# Patient Record
Sex: FEMALE | URBAN - METROPOLITAN AREA
[De-identification: names, ages, dates, MRNs, and addresses within clinical notes are randomized per-mention and may not be internally consistent; named-entity substitution may affect disease eponyms.]

---

## 2020-03-13 ENCOUNTER — OFFICE VISIT (OUTPATIENT)
Dept: URBAN - METROPOLITAN AREA CLINIC 17 | Facility: CLINIC | Age: 75
End: 2020-03-13
Payer: MEDICARE

## 2020-03-13 DIAGNOSIS — E11.3293 TYPE 2 DIAB W MILD NONPRLF DIABETIC RTNOP W/O MACULAR EDEMA, BILATERAL: ICD-10-CM

## 2020-03-13 DIAGNOSIS — H35.341 MACULAR CYST, HOLE, OR PSEUDOHOLE, RIGHT EYE: Primary | ICD-10-CM

## 2020-03-13 DIAGNOSIS — H04.123 DRY EYE SYNDROME OF BILATERAL LACRIMAL GLANDS: ICD-10-CM

## 2020-03-13 DIAGNOSIS — H43.813 VITREOUS DEGENERATION, BILATERAL: ICD-10-CM

## 2020-03-13 DIAGNOSIS — H11.153 PINGUECULA, BILATERAL: ICD-10-CM

## 2020-03-13 PROCEDURE — 99204 OFFICE O/P NEW MOD 45 MIN: CPT | Performed by: OPTOMETRIST

## 2020-03-13 PROCEDURE — 92134 CPTRZ OPH DX IMG PST SGM RTA: CPT | Performed by: OPTOMETRIST

## 2020-03-13 ASSESSMENT — INTRAOCULAR PRESSURE
OS: 15
OD: 14

## 2020-03-13 NOTE — IMPRESSION/PLAN
Impression: Type 2 diab w mild nonprlf diabetic rtnop w/o macular edema, bilateral: L74.5361. Plan: Diabetes type II: mild background diabetic retinopathy, no signs of neovascularization noted. No treatment necessary at this time. Patient was instructed to monitor vision for sudden changes and to call if visual changes noted. Discussed ocular and systemic benefits of blood sugar control.

## 2020-03-13 NOTE — IMPRESSION/PLAN
Impression: Macular cyst, hole, or pseudohole, right eye: H35.341. Plan: Discussed diagnosis in detail with patient. Discussed treatment options with patient. Reassured patient of current condition and treatment. Consult recommended [[facility]  OCT shows Macular hole

## 2020-03-13 NOTE — IMPRESSION/PLAN
Impression: Puckering of macula, bilateral: H35.373. Plan: Discussed diagnosis in detail with patient. Discussed treatment options with patient. Reassured patient of current condition and treatment. Will continue to observe condition and or symptoms. Consult recommended [[facility]].

## 2020-03-13 NOTE — IMPRESSION/PLAN
Impression: Pinguecula, bilateral: H11.153. Plan: Discussed diagnosis in detail with patient. No treatment is required at this time. Will continue to observe condition and or symptoms. Patient instructed to call if condition gets worse.

## 2020-04-27 ENCOUNTER — OFFICE VISIT (OUTPATIENT)
Dept: URBAN - METROPOLITAN AREA CLINIC 17 | Facility: CLINIC | Age: 75
End: 2020-04-27
Payer: MEDICARE

## 2020-04-27 DIAGNOSIS — H43.311 VITREOUS MEMBRANES AND STRANDS, RIGHT EYE: ICD-10-CM

## 2020-04-27 DIAGNOSIS — H35.373 PUCKERING OF MACULA, BILATERAL: Primary | ICD-10-CM

## 2020-04-27 DIAGNOSIS — H43.821 VITREOMACULAR TRACTION OF RIGHT EYE: ICD-10-CM

## 2020-04-27 PROCEDURE — 92004 COMPRE OPH EXAM NEW PT 1/>: CPT | Performed by: OPHTHALMOLOGY

## 2020-04-27 RX ORDER — PREDNISOLONE ACETATE 10 MG/ML
1 % SUSPENSION/ DROPS OPHTHALMIC
Qty: 10 | Refills: 5 | Status: INACTIVE
Start: 2020-04-27 | End: 2020-10-22

## 2020-04-27 RX ORDER — OFLOXACIN 3 MG/ML
0.3 % SOLUTION/ DROPS OPHTHALMIC
Qty: 5 | Refills: 3 | Status: INACTIVE
Start: 2020-04-27 | End: 2020-04-27

## 2020-04-27 ASSESSMENT — INTRAOCULAR PRESSURE
OS: 20
OD: 19

## 2020-04-27 NOTE — IMPRESSION/PLAN
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 OD. Condition: worsening. Vision: vision affected. Plan: Advised patient of condition. Discussed diagnosis in detail with patient.  See notes above

## 2020-04-27 NOTE — IMPRESSION/PLAN
Impression: Vitreous membranes and strands, right eye: H43.311 OD. Condition: worsening. Vision: vision affected. Plan: Advised patient of condition. Discussed diagnosis in detail with patient.  See notes above

## 2020-04-27 NOTE — IMPRESSION/PLAN
Impression: Puckering of macula, bilateral: H35.373. OD>OS. Condition: worsening. Vision: vision affected OD>OS. Plan: Discussed diagnosis in detail with patient. Discussed risks of progression. Surgical treatment is recommended to remove scar tissue for vision improvement PPVx RIGHT EYE FIRST. Surgical risks and benefits were discussed, explained and understood by patient. All questions answered. RL1. Patient elects to proceed with recommendation. OCT performed today: ERM/ VMT with significant traction cystic changes OD and Optos shows VMT with significant traction with cystic change OD. Educational material provided to patient. The exam, OCT and optos of the left eye shows an ERM. Surgery is not required at this time. Recommend observation will reassess once the right eye has recovered from surgery. 
Erxed Drops to pharmacy on file

## 2020-06-22 ENCOUNTER — SURGERY (OUTPATIENT)
Dept: URBAN - METROPOLITAN AREA SURGERY 7 | Facility: SURGERY | Age: 75
End: 2020-06-22
Payer: MEDICARE

## 2020-06-22 PROCEDURE — 67042 VIT FOR MACULAR HOLE: CPT | Performed by: OPHTHALMOLOGY

## 2020-06-23 ENCOUNTER — POST-OPERATIVE VISIT (OUTPATIENT)
Dept: URBAN - METROPOLITAN AREA CLINIC 17 | Facility: CLINIC | Age: 75
End: 2020-06-23

## 2020-06-23 DIAGNOSIS — Z48.810 ENCNTR FOR SURGICAL AFTCR FOL SURGERY ON THE SENSE ORGANS: ICD-10-CM

## 2020-06-23 PROCEDURE — 99024 POSTOP FOLLOW-UP VISIT: CPT | Performed by: OPTOMETRIST

## 2020-06-23 ASSESSMENT — INTRAOCULAR PRESSURE: OS: 18

## 2020-06-30 ENCOUNTER — POST-OPERATIVE VISIT (OUTPATIENT)
Dept: URBAN - METROPOLITAN AREA CLINIC 17 | Facility: CLINIC | Age: 75
End: 2020-06-30

## 2020-06-30 PROCEDURE — 99024 POSTOP FOLLOW-UP VISIT: CPT | Performed by: OPTOMETRIST

## 2020-06-30 ASSESSMENT — INTRAOCULAR PRESSURE
OD: 24
OS: 15

## 2020-07-13 ENCOUNTER — POST-OPERATIVE VISIT (OUTPATIENT)
Dept: URBAN - METROPOLITAN AREA CLINIC 33 | Facility: CLINIC | Age: 75
End: 2020-07-13
Payer: MEDICARE

## 2020-07-13 DIAGNOSIS — Z09 ENCNTR FOR F/U EXAM AFT TRTMT FOR COND OTH THAN MALIG NEOPLM: Primary | ICD-10-CM

## 2020-07-13 PROCEDURE — 99024 POSTOP FOLLOW-UP VISIT: CPT | Performed by: OPHTHALMOLOGY

## 2020-07-13 ASSESSMENT — INTRAOCULAR PRESSURE
OD: 15
OS: 16

## 2020-08-12 ENCOUNTER — POST-OPERATIVE VISIT (OUTPATIENT)
Dept: URBAN - METROPOLITAN AREA CLINIC 17 | Facility: CLINIC | Age: 75
End: 2020-08-12
Payer: MEDICARE

## 2020-08-12 PROCEDURE — 99024 POSTOP FOLLOW-UP VISIT: CPT | Performed by: OPTOMETRIST

## 2020-08-12 ASSESSMENT — INTRAOCULAR PRESSURE
OS: 16
OD: 19

## 2020-10-22 ENCOUNTER — OFFICE VISIT (OUTPATIENT)
Dept: URBAN - METROPOLITAN AREA CLINIC 17 | Facility: CLINIC | Age: 75
End: 2020-10-22
Payer: MEDICARE

## 2020-10-22 DIAGNOSIS — H25.13 AGE-RELATED NUCLEAR CATARACT, BILATERAL: ICD-10-CM

## 2020-10-22 PROCEDURE — 92014 COMPRE OPH EXAM EST PT 1/>: CPT | Performed by: OPHTHALMOLOGY

## 2020-10-22 PROCEDURE — 92134 CPTRZ OPH DX IMG PST SGM RTA: CPT | Performed by: OPHTHALMOLOGY

## 2020-10-22 ASSESSMENT — INTRAOCULAR PRESSURE
OD: 15
OS: 12

## 2020-10-22 NOTE — IMPRESSION/PLAN
Impression: Age-related nuclear cataract, bilateral: H25.13. OD >OS Condition: worsening. Vision: vision affected. Plan: Advised patient of condition. Discussed diagnosis in detail with patient.  Consult recommended with [facility]act Special[facility].

## 2020-10-22 NOTE — IMPRESSION/PLAN
Impression: Puckering of macula, bilateral: H35.373. OD>OS. Condition: resolved OD and stable OS. Vision: vision improved OD vision affected OS.
s/p 25G PPVX,ERMX CATA,Mem Blue OD 6/22/2020 Plan: Exam right eye shows mild ILM negative ERM s/p PPVx and exam left eye shows ERM. Advised patient of condition. Discussed diagnosis in detail with patient. No treatment is required at this time. Will continue to observe condition and or symptoms. Call if [address] worsens. OCT OU shows mild ILM/ERM.

## 2021-01-18 ENCOUNTER — OFFICE VISIT (OUTPATIENT)
Dept: URBAN - METROPOLITAN AREA CLINIC 17 | Facility: CLINIC | Age: 76
End: 2021-01-18
Payer: MEDICARE

## 2021-01-18 DIAGNOSIS — H25.811 COMBINED FORMS OF AGE-RELATED CATARACT, RIGHT EYE: Primary | ICD-10-CM

## 2021-01-18 DIAGNOSIS — H35.372 PUCKERING OF MACULA, LEFT EYE: ICD-10-CM

## 2021-01-18 DIAGNOSIS — H25.12 AGE-RELATED NUCLEAR CATARACT, LEFT EYE: ICD-10-CM

## 2021-01-18 PROCEDURE — 99214 OFFICE O/P EST MOD 30 MIN: CPT | Performed by: OPHTHALMOLOGY

## 2021-01-18 ASSESSMENT — VISUAL ACUITY
OD: 20/150
OS: 20/40

## 2021-01-18 ASSESSMENT — INTRAOCULAR PRESSURE
OS: 17
OD: 16

## 2021-01-18 ASSESSMENT — KERATOMETRY
OS: 45.50
OD: 45.63

## 2021-01-18 NOTE — IMPRESSION/PLAN
Impression: Puckering of macula, left eye: H35.372. Plan: Discussed diagnosis in detail with patient. Advised patient of condition, recommend continuing with retina care provided by Dr Benny Sanchez.  Keep all upcoming appts

## 2021-01-18 NOTE — IMPRESSION/PLAN
Impression: Combined forms of age-related cataract, right eye: H25.811. Condition: established, worsening. Symptoms: could improve with surgery. Plan: Cataract accounts for patient's complaints. Reviewed risks, benefits, and procedure. Patient desires surgery, schedule ce/iol OD, RL2, discussed lens options, distance refractive target, patient is clear for surgery in [facility]. Pt to use NSAID and Steroids (Or Combo drops) for 6-8 weeks after surgery.

## 2021-01-18 NOTE — IMPRESSION/PLAN
Impression: Age-related nuclear cataract, left eye: H25.12. Plan: Discussed diagnosis in detail with patient. No surgical treatment currently recommended. The patient will monitor vision changes and contact us with any decrease in vision.  Continue retina care with Dr Cecile White

## 2022-07-29 ENCOUNTER — OFFICE VISIT (OUTPATIENT)
Dept: URBAN - METROPOLITAN AREA CLINIC 17 | Facility: CLINIC | Age: 77
End: 2022-07-29
Payer: MEDICARE

## 2022-07-29 DIAGNOSIS — H25.12 AGE-RELATED NUCLEAR CATARACT, LEFT EYE: ICD-10-CM

## 2022-07-29 DIAGNOSIS — H35.372 PUCKERING OF MACULA, LEFT EYE: ICD-10-CM

## 2022-07-29 DIAGNOSIS — H25.811 COMBINED FORMS OF AGE-RELATED CATARACT, RIGHT EYE: Primary | ICD-10-CM

## 2022-07-29 PROCEDURE — 99214 OFFICE O/P EST MOD 30 MIN: CPT | Performed by: OPHTHALMOLOGY

## 2022-07-29 ASSESSMENT — KERATOMETRY
OD: 45.50
OS: 45.13

## 2022-07-29 ASSESSMENT — INTRAOCULAR PRESSURE
OS: 12
OD: 16

## 2022-07-29 ASSESSMENT — VISUAL ACUITY
OD: 20/400
OS: 20/30

## 2022-07-29 NOTE — IMPRESSION/PLAN
Impression: Puckering of macula, left eye: H35.372. Plan: Discussed diagnosis in detail with patient. Advised patient of condition, recommend continuing with retina care provided by Dr Lianna Reinoso. Recommend pt reschedule retina appt.

## 2022-07-29 NOTE — IMPRESSION/PLAN
Impression: Combined forms of age-related cataract, right eye: H25.811. Condition: established, worsening. Symptoms: could improve with surgery. Plan: Cataract accounts for patient's complaints. Reviewed risks, benefits, and procedure. Patient desires surgery, schedule ce/iol OD, RL3 (Cardiologist), discussed lens options, distance refractive target, patient is clear for surgery in [place] [id number]. Recommend Dexycu to avoid noncompliance with drops and to help maintain infection/inflammation. Pt to use NSAID for 6-8 weeks after surgery. Advised pt visual improvement may be limited due to previous ERM surgery. Pt aware and would like to proceed.

## 2022-07-29 NOTE — IMPRESSION/PLAN
Impression: Age-related nuclear cataract, left eye: H25.12. Plan: Discussed diagnosis in detail with patient. No surgical treatment currently recommended. The patient will monitor vision changes and contact us with any decrease in vision.  Continue retina care with Dr Gabe Montenegro

## 2022-08-23 ENCOUNTER — PRE-OPERATIVE VISIT (OUTPATIENT)
Dept: URBAN - METROPOLITAN AREA CLINIC 17 | Facility: CLINIC | Age: 77
End: 2022-08-23
Payer: MEDICARE

## 2022-08-23 DIAGNOSIS — H25.811 COMBINED FORMS OF AGE-RELATED CATARACT, RIGHT EYE: Primary | ICD-10-CM

## 2022-08-23 ASSESSMENT — PACHYMETRY
OS: 23.96
OD: 2.76
OS: 2.75
OD: 24.20

## 2022-09-01 ENCOUNTER — SURGERY (OUTPATIENT)
Dept: URBAN - METROPOLITAN AREA SURGERY 7 | Facility: SURGERY | Age: 77
End: 2022-09-01
Payer: MEDICARE

## 2022-09-01 DIAGNOSIS — H25.811 COMBINED FORMS OF AGE-RELATED CATARACT, RIGHT EYE: Primary | ICD-10-CM

## 2022-09-01 PROCEDURE — 66984 XCAPSL CTRC RMVL W/O ECP: CPT | Performed by: OPHTHALMOLOGY

## 2022-09-02 ENCOUNTER — POST-OPERATIVE VISIT (OUTPATIENT)
Dept: URBAN - METROPOLITAN AREA CLINIC 17 | Facility: CLINIC | Age: 77
End: 2022-09-02
Payer: MEDICARE

## 2022-09-02 DIAGNOSIS — Z48.810 ENCOUNTER FOR SURGICAL AFTERCARE FOLLOWING SURGERY ON A SENSE ORGAN: Primary | ICD-10-CM

## 2022-09-02 PROCEDURE — 99024 POSTOP FOLLOW-UP VISIT: CPT

## 2022-09-02 ASSESSMENT — INTRAOCULAR PRESSURE
OS: 12
OD: 25

## 2022-09-02 NOTE — IMPRESSION/PLAN
Impression: S/P Cataract Extraction by phacoemulsification with IOL placement 22839 OD - 1 Day. Encounter for surgical aftercare following surgery on a sense organ  Z48.810. Plan: Eye healing appropriately. Return to clinic in 1 week for PO2 with dilation --Advised patient to use artificial tears for comfort.

## 2022-09-08 ENCOUNTER — POST-OPERATIVE VISIT (OUTPATIENT)
Dept: URBAN - METROPOLITAN AREA CLINIC 17 | Facility: CLINIC | Age: 77
End: 2022-09-08
Payer: MEDICARE

## 2022-09-08 DIAGNOSIS — Z48.810 ENCOUNTER FOR SURGICAL AFTERCARE FOLLOWING SURGERY ON A SENSE ORGAN: Primary | ICD-10-CM

## 2022-09-08 PROCEDURE — 99024 POSTOP FOLLOW-UP VISIT: CPT | Performed by: OPTOMETRIST

## 2022-09-08 RX ORDER — KETOROLAC TROMETHAMINE 4 MG/ML
0.4 % SOLUTION/ DROPS OPHTHALMIC
Qty: 5 | Refills: 1 | Status: ACTIVE
Start: 2022-09-08

## 2022-09-08 ASSESSMENT — INTRAOCULAR PRESSURE
OD: 16
OS: 17

## 2022-09-08 NOTE — IMPRESSION/PLAN
Impression: S/P Cataract Extraction by phacoemulsification with IOL placement 04560 OD - 7 Days. Encounter for surgical aftercare following surgery on a sense organ  Z48.810. Post operative instructions reviewed - Plan: NSAID to be used after CEIOL. Pt to start Ketorolac QID OD x6-8 wks (ERX'd w/refill). Monitor. Call if [address] worsens. Obtained MAC OCT: No CME OD, stable mild ERM OS.

## 2022-09-19 ENCOUNTER — POST-OPERATIVE VISIT (OUTPATIENT)
Dept: URBAN - METROPOLITAN AREA CLINIC 17 | Facility: CLINIC | Age: 77
End: 2022-09-19
Payer: MEDICARE

## 2022-09-19 DIAGNOSIS — Z48.810 ENCOUNTER FOR SURGICAL AFTERCARE FOLLOWING SURGERY ON A SENSE ORGAN: Primary | ICD-10-CM

## 2022-09-19 PROCEDURE — 99024 POSTOP FOLLOW-UP VISIT: CPT | Performed by: OPTOMETRIST

## 2022-09-19 ASSESSMENT — INTRAOCULAR PRESSURE
OD: 15
OS: 16

## 2022-09-19 NOTE — IMPRESSION/PLAN
Impression: S/P Cataract Extraction by phacoemulsification with IOL placement 02252 OD - 18 Days. Encounter for surgical aftercare following surgery on a sense organ  Z48.810. Post operative instructions reviewed - Plan: AC w/trace cell & flare. Persistent cornea edema. Pt recently started Pred Acetate a few days ago (had trouble p/u drop from pharmacy). Recommend continuing Pred Acetate and taper QID x1wk, TID x1wk, BID x1wk. Qday x1wk, then d/c. Continue Ketorolac QID as directed. Pt to continue Systane gel drops QHS and Systane PF AFTs 4x's daily. Monitor. Call if [address] worsens. Will reassess in 3 wks. MAC OCT w/ ERM OU, no CME.

## 2022-10-20 ENCOUNTER — OFFICE VISIT (OUTPATIENT)
Dept: URBAN - METROPOLITAN AREA CLINIC 17 | Facility: CLINIC | Age: 77
End: 2022-10-20
Payer: COMMERCIAL

## 2022-10-20 DIAGNOSIS — H52.4 PRESBYOPIA: Primary | ICD-10-CM

## 2022-10-20 PROCEDURE — 92012 INTRM OPH EXAM EST PATIENT: CPT | Performed by: OPTOMETRIST

## 2022-10-20 ASSESSMENT — VISUAL ACUITY
OD: 20/60
OS: 20/30

## 2022-10-20 ASSESSMENT — INTRAOCULAR PRESSURE
OS: 18
OD: 22

## 2022-10-20 NOTE — IMPRESSION/PLAN
Impression: Presbyopia: H52.4. Plan: Discussed diagnosis in detail with patient. New SRx was given today. Discussed OD only corrected to 20/60 due to retina hx. Ordered and reviewed MAC OCT. OD shows ERM with foveal distortion S/P PPVx 2020 with Dr. Scott Puga and OS shows stable ERM. Patient may d/c Ketorolac. Recommend patient to follow up with Dr. Scott Puga to reassess retina.